# Patient Record
Sex: MALE | URBAN - METROPOLITAN AREA
[De-identification: names, ages, dates, MRNs, and addresses within clinical notes are randomized per-mention and may not be internally consistent; named-entity substitution may affect disease eponyms.]

---

## 2022-06-29 ENCOUNTER — APPOINTMENT (OUTPATIENT)
Dept: URBAN - METROPOLITAN AREA CLINIC 319 | Age: 57
Setting detail: DERMATOLOGY
End: 2022-06-29

## 2022-06-29 DIAGNOSIS — L72.0 EPIDERMAL CYST: ICD-10-CM

## 2022-06-29 PROCEDURE — OTHER TREATMENT REGIMEN: OTHER

## 2022-06-29 PROCEDURE — 10060 I&D ABSCESS SIMPLE/SINGLE: CPT

## 2022-06-29 PROCEDURE — OTHER PRESCRIPTION MEDICATION MANAGEMENT: OTHER

## 2022-06-29 PROCEDURE — OTHER COUNSELING: OTHER

## 2022-06-29 PROCEDURE — OTHER INCISION AND DRAINAGE: OTHER

## 2022-06-29 PROCEDURE — OTHER MIPS QUALITY: OTHER

## 2022-06-29 ASSESSMENT — LOCATION DETAILED DESCRIPTION DERM: LOCATION DETAILED: RIGHT SUPERIOR UPPER BACK

## 2022-06-29 ASSESSMENT — LOCATION SIMPLE DESCRIPTION DERM: LOCATION SIMPLE: RIGHT UPPER BACK

## 2022-06-29 ASSESSMENT — LOCATION ZONE DERM: LOCATION ZONE: TRUNK

## 2022-06-29 NOTE — PROCEDURE: TREATMENT REGIMEN
Detail Level: Detailed
Detail Level: Zone
Plan: Will schedule for excision with Jh ARSHAD, if reoccurrence and becomes problematic.
Plan: Keep area clean with warm soap and water\\n\\nApply warm compresses to the area 2-3 times per day and allow for additional drainage, as warmth will loosen up material not expressed during the incision and drainage procedure.  If the lesion was too firm to be drained, applying warmth should aide in softening the material making drainage at your follow up visit easier and expedite the healing process.  If the lesion softens and drains on its own, allow it to do so, but do not manipulate the lesion as this can cause additional inflammation. \\n\\nWarm compresses can be made by wetting a washcloth, wringing it out and placing it in a microwave for 30 seconds.  Be careful to not burn yourself in the process, the cloth will be hot. \\n\\nIt is OK to allow clean warm water to run over the area (ex. shower), however do not submerge the area if open, such as in a bathtub, pool, ocean or other body of water. \\n\\nIf the lesion has been opened, keep covered with a clean, loose dressing to absorb any additional drainage that may occur.  Be careful not to apply this bandage too tight as this can occlude the lesion and inhibit drainage.  \\n\\nIf the lesion has packing placed, remove approx 1-2cm daily.  Packing is placed primarily to keep the incision site open to allow drainage, and to delay any premature closure which can slow healing.  If this packing is accidentally pulled out, no need to worry, but do NOT attempt to re-insert packing into wound.\\n\\nIf prescribed, take antibiotics as directed.  Many inflamed cysts are not infected and do NOT require antibiotics.

## 2022-06-29 NOTE — PROCEDURE: INCISION AND DRAINAGE
Anesthesia Volume In Cc: 0.5
Consent was obtained and risks were reviewed including but not limited to delayed wound healing, infection, need for multiple I and D's, and pain.
Include Sutures?: No
Curette Text (Optional): After the contents were expressed a curette was used to partially remove the cyst wall.
Lesion Type: Cyst
Drainage Amount?: significant
Preparation Text: The area was prepped in the usual clean fashion.
Anesthesia Type: 1% lidocaine with epinephrine
Dressing: dry sterile dressing
Size Of Lesion In Cm (Optional But May Be Required For Some Insurances): 0
Post-Care Instructions: I reviewed with the patient in detail post-care instructions. Patient should keep wound covered and call the office should any redness, pain, swelling or worsening occur.
Suture Text: The incision was partially closed with
Detail Level: Detailed
Epidermal Sutures: 4-0 Ethilon
Wound Care: Aquaphor
Method: 11 blade
Epidermal Closure: simple interrupted
Drainage Type?: keratinaceous

## 2022-06-29 NOTE — PROCEDURE: MIPS QUALITY
Quality 130: Documentation Of Current Medications In The Medical Record: Current Medications Documented
Quality 226: Preventive Care And Screening: Tobacco Use: Screening And Cessation Intervention: Patient screened for tobacco use and is an ex/non-smoker
Detail Level: Detailed
Quality 110: Preventive Care And Screening: Influenza Immunization: Influenza Immunization not Administered for Documented Reasons.
Quality 431: Preventive Care And Screening: Unhealthy Alcohol Use - Screening: Patient not identified as an unhealthy alcohol user when screened for unhealthy alcohol use using a systematic screening method